# Patient Record
Sex: FEMALE | Race: WHITE
[De-identification: names, ages, dates, MRNs, and addresses within clinical notes are randomized per-mention and may not be internally consistent; named-entity substitution may affect disease eponyms.]

---
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
## 2025-01-27 ENCOUNTER — APPOINTMENT (OUTPATIENT)
Dept: PEDIATRIC PULMONARY CYSTIC FIB | Facility: CLINIC | Age: 20
End: 2025-01-27
Payer: MEDICAID

## 2025-01-27 VITALS
BODY MASS INDEX: 37.22 KG/M2 | SYSTOLIC BLOOD PRESSURE: 103 MMHG | OXYGEN SATURATION: 100 % | WEIGHT: 199.7 LBS | HEART RATE: 89 BPM | DIASTOLIC BLOOD PRESSURE: 67 MMHG | HEIGHT: 61.46 IN

## 2025-01-27 DIAGNOSIS — R05.9 COUGH, UNSPECIFIED: ICD-10-CM

## 2025-01-27 DIAGNOSIS — J30.9 ALLERGIC RHINITIS, UNSPECIFIED: ICD-10-CM

## 2025-01-27 DIAGNOSIS — K21.9 GASTRO-ESOPHAGEAL REFLUX DISEASE W/OUT ESOPHAGITIS: ICD-10-CM

## 2025-01-27 DIAGNOSIS — J45.40 MODERATE PERSISTENT ASTHMA, UNCOMPLICATED: ICD-10-CM

## 2025-01-27 DIAGNOSIS — E66.9 OBESITY, UNSPECIFIED: ICD-10-CM

## 2025-01-27 PROCEDURE — 99215 OFFICE O/P EST HI 40 MIN: CPT | Mod: 25

## 2025-01-27 PROCEDURE — 94010 BREATHING CAPACITY TEST: CPT
